# Patient Record
(demographics unavailable — no encounter records)

---

## 2025-06-18 NOTE — DISCUSSION/SUMMARY
[FreeTextEntry1] : last seen Sept 2024 63yrs, ischemic CMP. s/p CABG 2019.  s/p enrique clip July 2020. Felt much better after.  on coumadin for LV thrombus June 2022.  no admissions for HF. Walking 3-5 miles per day.  meds; toprol 50, entresto max, [id number]  QD , zetia. atorva 80 ASA. eplerenone 12.5 QD off coumadin d/c  tolerate farxega.or jiariance   Feb 2024: CPET 13'39" METS 6.4, PVO2 22.3 (80%), RER >1.1, peak  (87%) peak /66 , VO@ at AT 12.5, (45%) VE/VCO2 29.  Feb 2025: CPET 12'48", METS 5.7, pVO2 20.1 (73%), RER > 1.1, peak , peak /72, VO2@AT 46%  VE/VCO2  32 Feb 2024 TTE: LVEDD 5.9, LVEF 24, normal WT, RV normal. TAPSE .8? enrique clip mild intra ricki MR, peak 4.1/2.0 PASP 23. IVC normal  April 2025: interrogation no events. not RV pacing  99/65 HR 57 188 (196)  no recent labs.  Stable NYHA 2.  See 6 mths after  ECHO (s/p CABG 2019) and for annual echo assessment.  Jarocho Chakraborty  45 mins with patient and chart excluding EKG

## 2025-06-18 NOTE — HISTORY OF PRESENT ILLNESS
[FreeTextEntry1] : Mr. Waite is a 62 y/o M with ICM (LVIDd 6 cm, LVEF 25%) s/p single chamber ICD (1/2020), CAD c/b anterolateral STEMI s/p CABG x 3 (SVG to LAD, OM, PDA by Dr. Pino at [facility] 6/2019), TIA (2019), severe MR s/p MitraClip x2 (7/2020) and LV thrombus (Dx 6/2022, on Coumadin) who presents for routine follow-up of his cardiomyopathy.   Initial presentation 6/2019 with crushing chest pain found to be have STEMI. Initial cath showed 100% stenosis of the LAD, 90% pCx, 85% mRCA and 50% RPDA. Ballooning of LAD was attempted and Impella was placed in the cath lab. He was taken emergently for CABG x 3 at which time the Impella was exchanged for an IABP. Post operative course was uncomplicated with the exception of loss of sensation along the distal aspect of his L shin and second and third toes of his left foot. LVEF noted to be 20-25%. Prior to presentation Mr. Waite was very active, biking 40 miles regularly on the weekend and hiking.  In July 2019, he was hospitalized for a TIA, presenting with acute left sided weakness. CTA of the head and neck and carotid dopplers done at the time were negative. MRI was unable to be completed due to claustrophobia.February 2020, underwent RHC that revealed post capillary pulmonary hypertension with excellent response to Nipride, so was started on low dose HDZN which he did not tolerate due to worsening SOB and fatigue. Underwent MitraClip x2 July 2020 with Dr. Olmos with improvement of MR from severe to mild. During CPET in October 2020, found to ST changes with lightheadedness for which he underwent nuclear stress test on 10/30/20 that was not suggestive of ischemia. Hospitalized June 2022 after TTE revealed new LV thrombus and was started on Coumadin.   Last seen in March. He underwent annual TTE and CPET last in 3/2024.

## 2025-06-18 NOTE — CARDIOLOGY SUMMARY
[de-identified] : \par  11/9/21 EKG: NSR at 60 bpm, no significant change from prior on 9/29/21\par  9/29/2020 EKG: NSR  HR 74, PRWP, TWI I, aVL, V2-V5\par  8/11/2020 EKG: NSR HR 67, PRWP, TWI I, aVL, lateral leads\par  2/25/20 EKG: NSR HR 72, PRWP, TWI lead I, aVR, V2-V4   \par  10/2/19 EKG: SR HR 75, LAD, PRWP, TWI lateral leads\par   [de-identified] : 2/21/25 CPET: 12:48min ,stopped d./t leg fatigue, max  80% of MPHR, METS 5.7, PEAK VO2 20.1 (73% of predicted), RER >=1.1 at peak exercise,    2/8/24 CPET: Exercise time 13:39 seconds, 6.4 METS, Peak VO2 22.3 ml/kg/min (80% of predicted), RER >= 1.1 at peak exercise, Peak  bpm which was 87% of the MPHR (159 bpm), peak /66 indicating a hypertensive response to incremental exercise, VO2 at anerobic threshold 12.5 ml/kg/min (45% of predicted), Ve/VCO2 slope 29 at peak exercise. Gomez Class A cardiac failure based on peak VO2. All markers of aerobic capacity and ventilatory efficiency were normal. Elevated dyspnea index is not unusual in a well conditioned individual and is not necessarily indicative of a breathing limitation or ventilatory insufficiency. Compared to CPET from 8/18/22, there was an approximate 14% increase in pVO2 for the same work protocol witha corresponding 5% increase in percent predicted pVO2. Exercise duration increased by 1:52 min with a corresponding 17% increase in total work output. Markers reflecting the aerobic contribution to work and ventilatory efficiency remain within normal limits.   10/30/20 exercise stress test: large-sized severe defects in mid to distal anterior, distal anteroseptal, and apical walls that are fixed, consistent with infarcts. There is a medium-size, mild to mod fixed defects in the basal inferoseptum consistent with infarct. No evidence of ischemia   10/21/20 CPET: Gomez Class B cardiac failure based on maximal oxygen uptake (peak VO2) of 18.5 ml/kg/min. ECG changes may be consistent with ischemia as the test was symptom limited. Compared to prior baseline ECG, Tw inversions in II and aVF are new with pseudonormalizaiton of Tw in anterior leads.   2/2018 exercise stress test: negative for ischemia, arrhythmia, angina  [de-identified] : 2/8/24 TTE: LVIDd 5.9 cm, LVEF 24% (global), increased LV mass and eccentric hypertrophy (septum and PWT 1 cm), normal RV size and function TAPSE 0.8 cm, normal RV size, percutaneous onkh-zf-[id number] mitral repair device with 2 MitraClips with mild intravalvular mR (peak/mean gradient 4.1 and 2 mmHg respectively at HR of 60 bpm), trace TR, est PASP 23 mmHg, IVC normal size with normal inspiratory collapse  8/22/22 TTE: LA 4.6, LVIDd 6, LVEF 25%, LV thrombus no longer visualized, RV grossly normal, enrique clip visualized with normal gradient 6/1/22 TTE: LA 4.6, LVEDD 6.0, LVEF 23%, LV thrombus 1.9 x 1.2. RV grossly normal. s/p enrique clip mild MR. normal grads. no RVSP   9/23/20 TTE: LVEF 22%, LVIDd 6.6cm, severe global hypokinesis with minor regional variation, lg area of apical akinesis, nl RV with low normal RV function, mild MR s/p clip x 2, min TR, est RVSP 24mmHg    7/9/20 TTE (s/p mitraclip): LVEF 20-25%, LVIDd 5.9, LA 5.0, nl RV size and function, mod dilated LA, nl RA, mild MR, min TR, est RAP 8 mmHg, est RVSP 32 mmHg  1/29/20 TTE: LVIDd 6.2cm, LVEF 20-25% (global), RVE with decreased RVSF, severely dilated LA, mod ANDREW, severe MR, mod TR, severe PH (RVSP 77mmHg), no pericardial effusion  11/18/19 TTE: LVEF 20%, LVIDd 5.7cm, LA 5.8cm, Septum/PWT 1.1cm, severe global LVSD with regional variation (septum and apex are akinetic), no LV thrombus, increased E/e' c/w elevated LV filling pressures, nl RV size with RVSD, mild ANDREW, mod-severe LA enlargement, mod MR, mild-mod TR, est RVSP 74 mm Hg  7/26/19 TTE: LVEF 20%, LVEDd 5.6cm, LA 5.0cm, PWT 1.5cm, IVS 0.9cm, aterior/apical, richard-septal akinesis, severe DD, RV nl size and function, LAE, nl RA, mod MR, mild-mod TR, PASP 50, IVC c/w nl RAP, pleural effusion  [de-identified] : \par  2/19/20 RHC: baseline HR 67, /67, Ao 97%, RA 5, RV 30/13, PA 71/30/44, PCWP 21 (v30), PA sat 67%, Richard CO/CI 4.4/2.6 PVR 5.2 Mullins, Phase II O2 10L/min HR 63, /70/82, Ao 100%, PA 55/31/40, PCWP 25 (v29), Phase III nipride 2ng/kg/min and O2 10L/min HR 66, /52/72, Ao 100%, PA 37/15/21, PCWP 11, PA sat 78, Richard CO/CI 6.2/3.2, PVR 1.6 Mullins\par  \par  6/16/19 LHC: dLM 50% stenosis, oLAD 100%, pCx 90%, mRCA 85%, RPDA 50%\par  RHC: Ao 102/77/89, RA 12, RV 50/11, PCWP 32, LVedp 35, Ao 85%, PA 62%, CO/CI 4.79/2.34, PVR 0.42 Mullins, SVR 1286 dsc\par